# Patient Record
(demographics unavailable — no encounter records)

---

## 2025-07-23 NOTE — ASSESSMENT
[FreeTextEntry1] : Rec she see PCP to discuss BP readings and consider treating this  Rec consistent exercise to maintain weight loss while discontinuing wegovy due to insurance issues  Able to commit to 3-4 days of exercising each week  Continue to get 2-3 meals with protein and fiber and increasing water intake  RTO in 1 month

## 2025-07-23 NOTE — HISTORY OF PRESENT ILLNESS
[Home] : at home, [unfilled] , at the time of the visit. [Other Location: e.g. Home (Enter Location, City,State)___] : at [unfilled] [Telehealth (audio & video)] : This visit was provided via telehealth using real-time 2-way audio visual technology. 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 Has 2 children & works as a   Gained 80 lbs total since this diagnosis. In April [facility] and lost 15 lbs- regained weight Past Wt Loss Attempts: Intermittent fasting, exercise, low inflammatory diet, tried gluten free diet, mediteranean diet  Highest Adult Wt: Oct 2019- 212 lbs  Lowest Adult Wt: 115 lbs, Goal: 145 lbs-- 150 lbs  Food Recall: B- fruit, L- ravioli, D-ribs on grill, vegetables Water Intake:20 ounces of tea and 40+ ounces Exercise Regimen: walking, has a gym membership at [employer], enjoys walking outdoors- summer 2-3 miles  Sleep: 8 + hours  Stress Level: medium stress, manages well  Gyn: desmoid tumor, 2 c/section, shanell-menopause since 42 y/o  Social Hx: social alcohol intake, former smoker, denies illicit drug use, works as a full time teacher  Labs: done in Aug 2023, Available in [employer]   11/7/23: Reports feeling well, tolerating ozempic 0.5 mg weekly. Lost 10 lbs so far and feels better overall. Eating well- B: banana, L- apple with nut buuter, D- chicken, vegetables. Water intake- inadequate. Continues to walk for exercise.   12/21/23: Taking wegovy 1.7 mg weekly. Ate an orange and bagel this morning for breakfast, going to [employer] tonight for dinner. Water intake- inadequate. Denies side effects. Playing pickle ball for exercise. BP elevated today, reports that BP was checked on 12/11 and normal 133/84, feels excited at the moment about her progress, denies headaches   2/6/24: Walking the track for exercise, aerobics home videos, pickle ball. + adequate water intake. Eats 2-3 healthy meals per day. Tolerating wegovy 2.4 mg weekly, lost 9.5% of total body weight. Happy with progress and feels well.   3/28/24: Walking track, playing pickle ball or going to the gym for exercise. Incorporating breakfast, lunch and dinner daily- smoothie, chicken pot pie, grilled cheese with miles, etc. Water intake- adequate. Feels well   6/20/24: Continues to feel well overall. Tolerating wegovy 2.4 mg weekly. Weight loss has plataued. Lost a total of 24 lbs according to our scale, lost 31 lbs according to home scale. Feels good with weight loss, wants to lose an additional 30 lbs. Making healthy food choices, getting adequate water and sleep, continues to exercise- plays Pickleball and walks the track.   9/19/24: Increased stress r/t starting a new school year and son being sick. She reports not eating enough, not exercising for the last 3 weeks, not drinking enough water, and still losing weight. She attributes this to the chaos of the new school year, getting a new classroom, unpacking special ed caseloads, and dealing with demanding parents. Ready to get back on track with exercise and open to increasing dietary intake. Plans on having a lymphoma that needs to be removed surgically next week. Continues to tolerate wegovy 2.4 mg weekly, plans on holding 1 week pre-operation. Labs done recently- reviewed in [facility]IE, glucose levels normalized.   7/23/25: Recently seen in [place] for clinical trial medicine- June 2025: reports BP of 160/80. Has been off wegovy x 1 month due to insurance dropping coverage for wegovy. Slacked on exercise recently, but wants to focus on re-incorporate. Water intake is better overall. Eats 2 meals most days.